# Patient Record
Sex: MALE | Race: ASIAN | URBAN - METROPOLITAN AREA
[De-identification: names, ages, dates, MRNs, and addresses within clinical notes are randomized per-mention and may not be internally consistent; named-entity substitution may affect disease eponyms.]

---

## 2023-06-23 ENCOUNTER — OFFICE VISIT (OUTPATIENT)
Dept: URBAN - METROPOLITAN AREA CLINIC 56 | Facility: LOCATION | Age: 73
End: 2023-06-23
Payer: COMMERCIAL

## 2023-06-23 DIAGNOSIS — H52.4 PRESBYOPIA: ICD-10-CM

## 2023-06-23 DIAGNOSIS — H35.52 RETINITIS PIGMENTOSA: ICD-10-CM

## 2023-06-23 DIAGNOSIS — E11.9 TYPE 2 DIABETES MELLITUS WITHOUT COMPLICATIONS: Primary | ICD-10-CM

## 2023-06-23 DIAGNOSIS — Z96.1 PRESENCE OF INTRAOCULAR LENS: ICD-10-CM

## 2023-06-23 PROCEDURE — 92133 CPTRZD OPH DX IMG PST SGM ON: CPT | Performed by: STUDENT IN AN ORGANIZED HEALTH CARE EDUCATION/TRAINING PROGRAM

## 2023-06-23 PROCEDURE — 92134 CPTRZ OPH DX IMG PST SGM RTA: CPT | Performed by: STUDENT IN AN ORGANIZED HEALTH CARE EDUCATION/TRAINING PROGRAM

## 2023-06-23 PROCEDURE — 92004 COMPRE OPH EXAM NEW PT 1/>: CPT | Performed by: STUDENT IN AN ORGANIZED HEALTH CARE EDUCATION/TRAINING PROGRAM

## 2023-06-23 ASSESSMENT — VISUAL ACUITY
OS: 20/30
OD: 20/25

## 2023-06-23 ASSESSMENT — INTRAOCULAR PRESSURE
OS: 20
OD: 20

## 2023-06-23 ASSESSMENT — KERATOMETRY
OD: 43.92
OS: 43.70

## 2023-06-23 NOTE — IMPRESSION/PLAN
Impression: Presence of intraocular lens: Z96.1. Plan: PC IOL with PC open OD and Restor IOL c/c OS.

## 2023-06-23 NOTE — IMPRESSION/PLAN
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 Plan: prev documented in 2009. Discussed vision limitations. Also discussed with pt and family members that dystrophy can be hereditary, recommend family members have dilated eye exams.

## 2023-06-23 NOTE — IMPRESSION/PLAN
Impression: Type 2 diabetes mellitus without complications: H35.2. Plan: continue strict BG control. F/u with PCP as directed. Call with vision changes.